# Patient Record
Sex: MALE | Race: BLACK OR AFRICAN AMERICAN
[De-identification: names, ages, dates, MRNs, and addresses within clinical notes are randomized per-mention and may not be internally consistent; named-entity substitution may affect disease eponyms.]

---

## 2019-10-14 ENCOUNTER — HOSPITAL ENCOUNTER (EMERGENCY)
Dept: HOSPITAL 35 - ER | Age: 29
Discharge: HOME | End: 2019-10-14
Payer: COMMERCIAL

## 2019-10-14 VITALS — DIASTOLIC BLOOD PRESSURE: 74 MMHG | SYSTOLIC BLOOD PRESSURE: 129 MMHG

## 2019-10-14 VITALS — BODY MASS INDEX: 32.2 KG/M2 | WEIGHT: 242.99 LBS | HEIGHT: 73 IN

## 2019-10-14 DIAGNOSIS — L01.00: ICD-10-CM

## 2019-10-14 DIAGNOSIS — Z90.89: ICD-10-CM

## 2019-10-14 DIAGNOSIS — Z88.8: ICD-10-CM

## 2019-10-14 DIAGNOSIS — Z88.6: ICD-10-CM

## 2019-10-14 DIAGNOSIS — R07.89: Primary | ICD-10-CM

## 2019-10-14 DIAGNOSIS — I10: ICD-10-CM

## 2019-10-14 LAB
ALBUMIN SERPL-MCNC: 3.8 G/DL (ref 3.4–5)
ALT SERPL-CCNC: 73 U/L (ref 30–65)
ANION GAP SERPL CALC-SCNC: 7 MMOL/L (ref 7–16)
AST SERPL-CCNC: 36 U/L (ref 15–37)
BASOPHILS NFR BLD AUTO: 0.8 % (ref 0–2)
BILIRUB SERPL-MCNC: 0.7 MG/DL
BUN SERPL-MCNC: 11 MG/DL (ref 7–18)
CALCIUM SERPL-MCNC: 9 MG/DL (ref 8.5–10.1)
CHLORIDE SERPL-SCNC: 103 MMOL/L (ref 98–107)
CO2 SERPL-SCNC: 29 MMOL/L (ref 21–32)
CREAT SERPL-MCNC: 1 MG/DL (ref 0.7–1.3)
EOSINOPHIL NFR BLD: 3.3 % (ref 0–3)
ERYTHROCYTE [DISTWIDTH] IN BLOOD BY AUTOMATED COUNT: 12.8 % (ref 10.5–14.5)
GLUCOSE SERPL-MCNC: 112 MG/DL (ref 74–106)
GRANULOCYTES NFR BLD MANUAL: 60 % (ref 36–66)
HCT VFR BLD CALC: 40.2 % (ref 42–52)
HGB BLD-MCNC: 13.6 GM/DL (ref 14–18)
LYMPHOCYTES NFR BLD AUTO: 26 % (ref 24–44)
MCH RBC QN AUTO: 31 PG (ref 26–34)
MCHC RBC AUTO-ENTMCNC: 33.7 G/DL (ref 28–37)
MCV RBC: 92.1 FL (ref 80–100)
MONOCYTES NFR BLD: 9.9 % (ref 1–8)
NEUTROPHILS # BLD: 4.2 THOU/UL (ref 1.4–8.2)
PLATELET # BLD: 218 THOU/UL (ref 150–400)
POTASSIUM SERPL-SCNC: 3.9 MMOL/L (ref 3.5–5.1)
PROT SERPL-MCNC: 7.6 G/DL (ref 6.4–8.2)
RBC # BLD AUTO: 4.37 MIL/UL (ref 4.5–6)
SODIUM SERPL-SCNC: 139 MMOL/L (ref 136–145)
WBC # BLD AUTO: 6.9 THOU/UL (ref 4–11)

## 2019-10-15 NOTE — EKG
[place] [address] [facility]
[place]  [address]
Phone:  [phone]                    ELECTROCARDIOGRAM REPORT      
_______________________________________________________________________________
 
Name:       LIO YOUNGER           [facility]                     [id number]     Account #:      [id number]  
Admission:  10/14/19    Attend Phys:                          
Discharge:  10/14/19    Date of Birth:  [birth year]  
                                                          Report #: [id number]
   [id number]
_______________________________________________________________________________
THIS REPORT FOR:   //name//                          
 
                         [facility] ED
                                       
Test Date:    2019-10-14               Test Time:    14:02:20
Pat Name:     LIO YOUNGER            Department:   
Patient ID:   [id number]            Room:          
Gender:                               Technician:   CHARLES
:          1990               Requested By: Rhonda Souza
Order Number: [id number] MD:   Steven Elmore
                                 Measurements
Intervals                              Axis          
Rate:         78                       P:            -5
MN:           159                      QRS:          6
QRSD:         86                       T:            13
QT:           370                                    
QTc:          422                                    
                           Interpretive Statements
Sinus rhythm
Poor R wave progression
Compared to ECG 2016 09:02:13
No significant change was found
Electronically Signed On 10- 7:59:04 CDT by Steven Elmore
[email or web address]
 
 
 
 
 
 
 
 
 
 
 
 
 
 
 
 
 
 
 
  <ELECTRONICALLY SIGNED>
   By: Steven Elmore MD, [facility]   
  10/15/19     0759
D: 10/14/19 1402                           _____________________________________
T: 10/14/19 140                           Steven Elmore MD, FACC     /EPI

## 2020-09-30 ENCOUNTER — HOSPITAL ENCOUNTER (EMERGENCY)
Dept: HOSPITAL 35 - ER | Age: 30
Discharge: HOME | End: 2020-09-30
Payer: COMMERCIAL

## 2020-09-30 VITALS — WEIGHT: 235.01 LBS | BODY MASS INDEX: 31.15 KG/M2 | HEIGHT: 73 IN

## 2020-09-30 VITALS — SYSTOLIC BLOOD PRESSURE: 120 MMHG | DIASTOLIC BLOOD PRESSURE: 76 MMHG

## 2020-09-30 DIAGNOSIS — Z88.8: ICD-10-CM

## 2020-09-30 DIAGNOSIS — Z79.899: ICD-10-CM

## 2020-09-30 DIAGNOSIS — J18.9: Primary | ICD-10-CM

## 2020-09-30 DIAGNOSIS — Z20.828: ICD-10-CM

## 2020-09-30 DIAGNOSIS — I10: ICD-10-CM

## 2020-09-30 DIAGNOSIS — R10.9: ICD-10-CM

## 2020-09-30 LAB
ANION GAP SERPL CALC-SCNC: 9 MMOL/L (ref 7–16)
BUN SERPL-MCNC: 10 MG/DL (ref 7–18)
CALCIUM SERPL-MCNC: 8.9 MG/DL (ref 8.5–10.1)
CHLORIDE SERPL-SCNC: 101 MMOL/L (ref 98–107)
CO2 SERPL-SCNC: 27 MMOL/L (ref 21–32)
CREAT SERPL-MCNC: 0.9 MG/DL (ref 0.7–1.3)
ERYTHROCYTE [DISTWIDTH] IN BLOOD BY AUTOMATED COUNT: 12.2 % (ref 10.5–14.5)
GLUCOSE SERPL-MCNC: 106 MG/DL (ref 74–106)
HCT VFR BLD CALC: 38.1 % (ref 42–52)
HGB BLD-MCNC: 13.5 GM/DL (ref 14–18)
MCH RBC QN AUTO: 31.3 PG (ref 26–34)
MCHC RBC AUTO-ENTMCNC: 35.5 G/DL (ref 28–37)
MCV RBC: 88.2 FL (ref 80–100)
PLATELET # BLD: 188 THOU/UL (ref 150–400)
POTASSIUM SERPL-SCNC: 4 MMOL/L (ref 3.5–5.1)
RBC # BLD AUTO: 4.32 MIL/UL (ref 4.5–6)
SODIUM SERPL-SCNC: 137 MMOL/L (ref 136–145)
TROPONIN I SERPL-MCNC: <0.06 NG/ML (ref ?–0.06)
WBC # BLD AUTO: 4.2 THOU/UL (ref 4–11)

## 2020-10-01 NOTE — EKG
[facility]
[address]
[place]   [address]                     ELECTROCARDIOGRAM REPORT      
_______________________________________________________________________________
 
Name:       LIO YOUNGER           Room #:                     [facility]#:      [id number]                       Account #:      [id number]  
Admission:  20    Attend Phys:                          
Discharge:  20    Date of Birth:  [birth year]  
                                                          Report #: [id number]
                                                                    [id number]
_______________________________________________________________________________
THIS REPORT FOR:  
 
cc:  [facility] family physician/PCP 
     [facility] - Johanny family physician/PCP 
     Steven Elmore MD [facility]
THIS REPORT FOR:   //name//                          
 
                         [facility] ED
                                       
Test Date:    2020               Test Time:    17:25:32
Pat Name:     LIO YOUNGER            Department:   
Patient ID:   [id number]            Room:          
Gender:                               Technician:   [facility]
:          1990               Requested By: Zeke Larios
Order Number: [id number] MD:   Steven Elmore
                                 Measurements
Intervals                              Axis          
Rate:         80                       P:            13
RI:           163                      QRS:          18
QRSD:         76                       T:            26
QT:           353                                    
QTc:          408                                    
                           Interpretive Statements
Sinus rhythm
Poor R wave progression
Compared to ECG 10/14/2019 14:02:20
No significant change was found
Electronically Signed On 10-1-2020 7:51:34 CDT by Steven Elmore
[email or web address]
 
 
 
 
 
 
 
 
 
 
 
 
 
 
 
  <ELECTRONICALLY SIGNED>
   By: Steven Elmore MD, FACC   
  10/01/20     0751
D: 20 1725                           _____________________________________
T: 20 1725                           Steven Elmore MD, [facility]     /EPI

## 2021-08-18 ENCOUNTER — HOSPITAL ENCOUNTER (EMERGENCY)
Dept: HOSPITAL 35 - ER | Age: 31
Discharge: HOME | End: 2021-08-18
Payer: COMMERCIAL

## 2021-08-18 VITALS — DIASTOLIC BLOOD PRESSURE: 92 MMHG | SYSTOLIC BLOOD PRESSURE: 157 MMHG

## 2021-08-18 VITALS — BODY MASS INDEX: 31.15 KG/M2 | WEIGHT: 235.01 LBS | HEIGHT: 73 IN

## 2021-08-18 DIAGNOSIS — Z88.1: ICD-10-CM

## 2021-08-18 DIAGNOSIS — I10: ICD-10-CM

## 2021-08-18 DIAGNOSIS — R11.0: ICD-10-CM

## 2021-08-18 DIAGNOSIS — Z90.89: ICD-10-CM

## 2021-08-18 DIAGNOSIS — R51.9: Primary | ICD-10-CM

## 2021-10-11 ENCOUNTER — HOSPITAL ENCOUNTER (EMERGENCY)
Dept: HOSPITAL 35 - ER | Age: 31
Discharge: HOME | End: 2021-10-11
Payer: COMMERCIAL

## 2021-10-11 VITALS — SYSTOLIC BLOOD PRESSURE: 150 MMHG | DIASTOLIC BLOOD PRESSURE: 96 MMHG

## 2021-10-11 VITALS — BODY MASS INDEX: 31.81 KG/M2 | WEIGHT: 240 LBS | HEIGHT: 73 IN

## 2021-10-11 DIAGNOSIS — R05.9: ICD-10-CM

## 2021-10-11 DIAGNOSIS — Z88.8: ICD-10-CM

## 2021-10-11 DIAGNOSIS — M94.0: ICD-10-CM

## 2021-10-11 DIAGNOSIS — R51.9: Primary | ICD-10-CM

## 2021-10-11 DIAGNOSIS — Z90.89: ICD-10-CM

## 2021-10-11 DIAGNOSIS — I10: ICD-10-CM

## 2021-10-11 DIAGNOSIS — J02.9: ICD-10-CM

## 2021-10-11 DIAGNOSIS — Z98.890: ICD-10-CM

## 2021-10-11 DIAGNOSIS — Z86.16: ICD-10-CM

## 2021-10-11 DIAGNOSIS — Z79.899: ICD-10-CM

## 2021-10-12 NOTE — EKG
[place] [address] [facility] [place]  [address]
Phone:  [phone]                    ELECTROCARDIOGRAM REPORT      
_______________________________________________________________________________
 
Name:       LIO YOUNGER           [facility]                     [id number]     Account #:      [id number]  
Admission:  10/11/21    Attend Phys:                          
Discharge:  10/11/21    Date of Birth:  [birth year]  
                                                          Report #: [id number]
   [id number]
_______________________________________________________________________________
                         [facility] ED
                                       
Test Date:    2021-10-11               Test Time:    20:06:56
Pat Name:     LIO YOUNGER            Department:   
Patient ID:   [id number]            Room:          
Gender:                               Technician:   TRE
:          1990               Requested By: Angel Torre
Order Number: [id number] MD:   Estiven Magana
                                 Measurements
Intervals                              Axis          
Rate:         74                       P:            -5
CT:           173                      QRS:          -1
QRSD:         83                       T:            6
QT:           370                                    
QTc:          411                                    
                           Interpretive Statements
Sinus rhythm
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
Compared to ECG 2020 17:25:32
Myocardial infarct finding now present
Poor R-wave progression no longer present
Electronically Signed On 10- 7:30:22 CDT by Estiven Magana
[email or web address]
 
 
 
 
 
 
 
 
 
 
 
 
 
 
 
 
 
 
 
 
  <ELECTRONICALLY SIGNED>
   By: Estiven Magana MD, [facility]    
  10/12/21     0730
D: 10/11/21 2006                           _____________________________________
T: 10/11/21 2006                           Estiven Magana MD, FACC      /EPI